# Patient Record
Sex: FEMALE | Race: WHITE | HISPANIC OR LATINO | Employment: UNEMPLOYED | URBAN - METROPOLITAN AREA
[De-identification: names, ages, dates, MRNs, and addresses within clinical notes are randomized per-mention and may not be internally consistent; named-entity substitution may affect disease eponyms.]

---

## 2024-10-10 ENCOUNTER — HOSPITAL ENCOUNTER (EMERGENCY)
Facility: HOSPITAL | Age: 39
Discharge: HOME | End: 2024-10-11
Attending: INTERNAL MEDICINE

## 2024-10-10 DIAGNOSIS — K59.00 CONSTIPATION, UNSPECIFIED CONSTIPATION TYPE: ICD-10-CM

## 2024-10-10 DIAGNOSIS — N28.1 CYST OF LEFT KIDNEY: Primary | ICD-10-CM

## 2024-10-10 LAB
ALBUMIN SERPL-MCNC: 3.7 G/DL (ref 3.5–5)
ALBUMIN/GLOB SERPL: 1.1 RATIO (ref 1.1–2)
ALP SERPL-CCNC: 145 UNIT/L (ref 40–150)
ALT SERPL-CCNC: 72 UNIT/L (ref 0–55)
ANION GAP SERPL CALC-SCNC: 9 MEQ/L
AST SERPL-CCNC: 33 UNIT/L (ref 5–34)
B-HCG UR QL: NEGATIVE
BASOPHILS # BLD AUTO: 0.03 X10(3)/MCL
BASOPHILS NFR BLD AUTO: 0.2 %
BILIRUB SERPL-MCNC: 0.4 MG/DL
BUN SERPL-MCNC: 16.7 MG/DL (ref 7–18.7)
CALCIUM SERPL-MCNC: 9.6 MG/DL (ref 8.4–10.2)
CHLORIDE SERPL-SCNC: 107 MMOL/L (ref 98–107)
CO2 SERPL-SCNC: 22 MMOL/L (ref 22–29)
CREAT SERPL-MCNC: 0.8 MG/DL (ref 0.55–1.02)
CREAT/UREA NIT SERPL: 21
CTP QC/QA: YES
EOSINOPHIL # BLD AUTO: 0.14 X10(3)/MCL (ref 0–0.9)
EOSINOPHIL NFR BLD AUTO: 1.2 %
ERYTHROCYTE [DISTWIDTH] IN BLOOD BY AUTOMATED COUNT: 13.3 % (ref 11.5–17)
GFR SERPLBLD CREATININE-BSD FMLA CKD-EPI: >60 ML/MIN/1.73/M2
GLOBULIN SER-MCNC: 3.4 GM/DL (ref 2.4–3.5)
GLUCOSE SERPL-MCNC: 110 MG/DL (ref 74–100)
HCT VFR BLD AUTO: 37.7 % (ref 37–47)
HGB BLD-MCNC: 12.8 G/DL (ref 12–16)
HOLD SPECIMEN: NORMAL
IMM GRANULOCYTES # BLD AUTO: 0.03 X10(3)/MCL (ref 0–0.04)
IMM GRANULOCYTES NFR BLD AUTO: 0.2 %
LIPASE SERPL-CCNC: 21 U/L
LYMPHOCYTES # BLD AUTO: 3.62 X10(3)/MCL (ref 0.6–4.6)
LYMPHOCYTES NFR BLD AUTO: 30.1 %
MCH RBC QN AUTO: 28.8 PG (ref 27–31)
MCHC RBC AUTO-ENTMCNC: 34 G/DL (ref 33–36)
MCV RBC AUTO: 84.7 FL (ref 80–94)
MONOCYTES # BLD AUTO: 0.66 X10(3)/MCL (ref 0.1–1.3)
MONOCYTES NFR BLD AUTO: 5.5 %
NEUTROPHILS # BLD AUTO: 7.55 X10(3)/MCL (ref 2.1–9.2)
NEUTROPHILS NFR BLD AUTO: 62.8 %
NRBC BLD AUTO-RTO: 0 %
PLATELET # BLD AUTO: 338 X10(3)/MCL (ref 130–400)
PMV BLD AUTO: 9.1 FL (ref 7.4–10.4)
POTASSIUM SERPL-SCNC: 3.9 MMOL/L (ref 3.5–5.1)
PROT SERPL-MCNC: 7.1 GM/DL (ref 6.4–8.3)
RBC # BLD AUTO: 4.45 X10(6)/MCL (ref 4.2–5.4)
SODIUM SERPL-SCNC: 138 MMOL/L (ref 136–145)
WBC # BLD AUTO: 12.03 X10(3)/MCL (ref 4.5–11.5)

## 2024-10-10 PROCEDURE — 25500020 PHARM REV CODE 255: Performed by: PHYSICIAN ASSISTANT

## 2024-10-10 PROCEDURE — 85025 COMPLETE CBC W/AUTO DIFF WBC: CPT | Performed by: PHYSICIAN ASSISTANT

## 2024-10-10 PROCEDURE — 63600175 PHARM REV CODE 636 W HCPCS: Performed by: PHYSICIAN ASSISTANT

## 2024-10-10 PROCEDURE — 96375 TX/PRO/DX INJ NEW DRUG ADDON: CPT

## 2024-10-10 PROCEDURE — 99285 EMERGENCY DEPT VISIT HI MDM: CPT | Mod: 25

## 2024-10-10 PROCEDURE — 96374 THER/PROPH/DIAG INJ IV PUSH: CPT

## 2024-10-10 PROCEDURE — 81001 URINALYSIS AUTO W/SCOPE: CPT | Performed by: PHYSICIAN ASSISTANT

## 2024-10-10 PROCEDURE — 83690 ASSAY OF LIPASE: CPT | Performed by: PHYSICIAN ASSISTANT

## 2024-10-10 PROCEDURE — 80053 COMPREHEN METABOLIC PANEL: CPT | Performed by: PHYSICIAN ASSISTANT

## 2024-10-10 PROCEDURE — 81025 URINE PREGNANCY TEST: CPT | Performed by: PHYSICIAN ASSISTANT

## 2024-10-10 RX ORDER — MORPHINE SULFATE 2 MG/ML
INJECTION, SOLUTION INTRAMUSCULAR; INTRAVENOUS
Status: DISCONTINUED
Start: 2024-10-10 | End: 2024-10-11 | Stop reason: WASHOUT

## 2024-10-10 RX ORDER — MORPHINE SULFATE 2 MG/ML
4 INJECTION, SOLUTION INTRAMUSCULAR; INTRAVENOUS ONCE
Status: COMPLETED | OUTPATIENT
Start: 2024-10-10 | End: 2024-10-10

## 2024-10-10 RX ORDER — DOCUSATE SODIUM 100 MG/1
100 CAPSULE, LIQUID FILLED ORAL 2 TIMES DAILY PRN
Qty: 20 CAPSULE | Refills: 0 | Status: SHIPPED | OUTPATIENT
Start: 2024-10-10 | End: 2024-10-20

## 2024-10-10 RX ORDER — KETOROLAC TROMETHAMINE 30 MG/ML
30 INJECTION, SOLUTION INTRAMUSCULAR; INTRAVENOUS
Status: COMPLETED | OUTPATIENT
Start: 2024-10-10 | End: 2024-10-10

## 2024-10-10 RX ORDER — ONDANSETRON 4 MG/1
4 TABLET, ORALLY DISINTEGRATING ORAL
Status: COMPLETED | OUTPATIENT
Start: 2024-10-11 | End: 2024-10-11

## 2024-10-10 RX ORDER — ONDANSETRON 4 MG/1
TABLET, ORALLY DISINTEGRATING ORAL
Status: DISPENSED
Start: 2024-10-10 | End: 2024-10-11

## 2024-10-10 RX ORDER — HYDROCODONE BITARTRATE AND ACETAMINOPHEN 5; 325 MG/1; MG/1
1 TABLET ORAL
Status: COMPLETED | OUTPATIENT
Start: 2024-10-11 | End: 2024-10-11

## 2024-10-10 RX ORDER — MORPHINE SULFATE 2 MG/ML
4 INJECTION, SOLUTION INTRAMUSCULAR; INTRAVENOUS ONCE
Status: DISCONTINUED | OUTPATIENT
Start: 2024-10-10 | End: 2024-10-10

## 2024-10-10 RX ORDER — MORPHINE SULFATE 2 MG/ML
INJECTION, SOLUTION INTRAMUSCULAR; INTRAVENOUS
Status: DISPENSED
Start: 2024-10-10 | End: 2024-10-11

## 2024-10-10 RX ORDER — KETOROLAC TROMETHAMINE 30 MG/ML
INJECTION, SOLUTION INTRAMUSCULAR; INTRAVENOUS
Status: DISPENSED
Start: 2024-10-10 | End: 2024-10-11

## 2024-10-10 RX ORDER — HYDROCODONE BITARTRATE AND ACETAMINOPHEN 5; 325 MG/1; MG/1
TABLET ORAL
Status: DISPENSED
Start: 2024-10-10 | End: 2024-10-11

## 2024-10-10 RX ADMIN — IOHEXOL 100 ML: 350 INJECTION, SOLUTION INTRAVENOUS at 08:10

## 2024-10-10 RX ADMIN — MORPHINE SULFATE 4 MG: 2 INJECTION, SOLUTION INTRAMUSCULAR; INTRAVENOUS at 11:10

## 2024-10-10 RX ADMIN — KETOROLAC TROMETHAMINE 30 MG: 30 INJECTION, SOLUTION INTRAMUSCULAR; INTRAVENOUS at 08:10

## 2024-10-11 VITALS
HEIGHT: 64 IN | WEIGHT: 193.81 LBS | SYSTOLIC BLOOD PRESSURE: 140 MMHG | OXYGEN SATURATION: 97 % | BODY MASS INDEX: 33.09 KG/M2 | DIASTOLIC BLOOD PRESSURE: 101 MMHG | RESPIRATION RATE: 16 BRPM | HEART RATE: 86 BPM | TEMPERATURE: 98 F

## 2024-10-11 LAB
BACTERIA #/AREA URNS AUTO: ABNORMAL /HPF
BILIRUB UR QL STRIP.AUTO: NEGATIVE
CLARITY UR: CLEAR
COLOR UR AUTO: COLORLESS
GLUCOSE UR QL STRIP: NORMAL
HGB UR QL STRIP: NEGATIVE
HYALINE CASTS #/AREA URNS LPF: ABNORMAL /LPF
KETONES UR QL STRIP: NEGATIVE
LEUKOCYTE ESTERASE UR QL STRIP: NEGATIVE
NITRITE UR QL STRIP: NEGATIVE
PH UR STRIP: 5.5 [PH]
PROT UR QL STRIP: NEGATIVE
RBC #/AREA URNS AUTO: ABNORMAL /HPF
SP GR UR STRIP.AUTO: 1.01 (ref 1–1.03)
SQUAMOUS #/AREA URNS LPF: ABNORMAL /HPF
UROBILINOGEN UR STRIP-ACNC: NORMAL
WBC #/AREA URNS AUTO: ABNORMAL /HPF

## 2024-10-11 PROCEDURE — 25000003 PHARM REV CODE 250: Performed by: PHYSICIAN ASSISTANT

## 2024-10-11 RX ADMIN — ONDANSETRON 4 MG: 4 TABLET, ORALLY DISINTEGRATING ORAL at 12:10

## 2024-10-11 RX ADMIN — HYDROCODONE BITARTRATE AND ACETAMINOPHEN 1 TABLET: 5; 325 TABLET ORAL at 12:10

## 2024-10-11 NOTE — DISCHARGE INSTRUCTIONS
Increase your daily water intake drinking 6-8 glasses of water every day.  Increase daily fiber intake, eating food high in fiber and/or taking fiber supplements that you can purchase over-the-counter at the pharmacy.  I also recommend taking daily probiotics to help encourage regular bowel movements.  You can also buy this at any pharmacy.  Return if symptoms worsen or do not improve.  Follow up with your primary care provider.  Referral sent to Internal Medicine for you to establish one.

## 2024-10-11 NOTE — ED PROVIDER NOTES
Encounter Date: 10/10/2024       History     Chief Complaint   Patient presents with    Abdominal Pain     LLQ pain x1 week     Karla Beltran is a 39 y.o. female who presents to the ED complaining of LLQ abdominal pain. Pt reports pain is sharp, stabbing in nature and occasionally radiates to lower back and right lower quadrant. Pain started 1 week ago and was intermittent, but has become constant and more severe today. Reports being diagnosed with diverticulosis in May and pain felt very similar. Last BM was today and was normal. She denies fevers, chills, nausea, vomiting, melena, hematochezia.     The history is provided by the patient and the spouse.     Review of patient's allergies indicates:  No Known Allergies  No past medical history on file.  No past surgical history on file.  No family history on file.     Review of Systems   Constitutional:  Negative for fever.   HENT:  Negative for sore throat.    Respiratory:  Negative for shortness of breath.    Cardiovascular:  Negative for chest pain.   Gastrointestinal:  Positive for abdominal pain. Negative for nausea and vomiting.   Genitourinary:  Negative for dysuria.   Musculoskeletal:  Negative for back pain.   Skin:  Negative for rash.   Neurological:  Negative for weakness.   Hematological:  Does not bruise/bleed easily.       Physical Exam     Initial Vitals [10/10/24 1910]   BP Pulse Resp Temp SpO2   (!) 139/98 100 20 97.9 °F (36.6 °C) 99 %      MAP       --         Physical Exam    Nursing note and vitals reviewed.  Constitutional: She appears well-developed and well-nourished. No distress.   HENT:   Head: Normocephalic and atraumatic. Mouth/Throat: No oropharyngeal exudate.   Eyes: EOM are normal. No scleral icterus.   Neck: Neck supple.   Normal range of motion.  Cardiovascular:  Normal rate and regular rhythm.           No murmur heard.  Pulmonary/Chest: Breath sounds normal. No respiratory distress. She has no wheezes.   Abdominal: Abdomen is soft.  Bowel sounds are normal. She exhibits no distension. There is abdominal tenderness in the left lower quadrant.   No right CVA tenderness.  No left CVA tenderness. There is no rebound and no guarding.   Musculoskeletal:         General: No tenderness. Normal range of motion.      Cervical back: Normal range of motion and neck supple.     Neurological: She is alert and oriented to person, place, and time. No cranial nerve deficit.   Skin: Skin is warm and dry. Capillary refill takes less than 2 seconds. No erythema.   Psychiatric: She has a normal mood and affect. Her behavior is normal. Judgment and thought content normal.         ED Course   Procedures  Labs Reviewed   COMPREHENSIVE METABOLIC PANEL - Abnormal       Result Value    Sodium 138      Potassium 3.9      Chloride 107      CO2 22      Glucose 110 (*)     Blood Urea Nitrogen 16.7      Creatinine 0.80      Calcium 9.6      Protein Total 7.1      Albumin 3.7      Globulin 3.4      Albumin/Globulin Ratio 1.1      Bilirubin Total 0.4            ALT 72 (*)     AST 33      eGFR >60      Anion Gap 9.0      BUN/Creatinine Ratio 21     CBC WITH DIFFERENTIAL - Abnormal    WBC 12.03 (*)     RBC 4.45      Hgb 12.8      Hct 37.7      MCV 84.7      MCH 28.8      MCHC 34.0      RDW 13.3      Platelet 338      MPV 9.1      Neut % 62.8      Lymph % 30.1      Mono % 5.5      Eos % 1.2      Basophil % 0.2      Lymph # 3.62      Neut # 7.55      Mono # 0.66      Eos # 0.14      Baso # 0.03      IG# 0.03      IG% 0.2      NRBC% 0.0     URINALYSIS, REFLEX TO URINE CULTURE - Abnormal    Color, UA Colorless (*)     Appearance, UA Clear      Specific Gravity, UA 1.011      pH, UA 5.5      Protein, UA Negative      Glucose, UA Normal      Ketones, UA Negative      Blood, UA Negative      Bilirubin, UA Negative      Urobilinogen, UA Normal      Nitrites, UA Negative      Leukocyte Esterase, UA Negative      RBC, UA 0-5      WBC, UA 0-5      Bacteria, UA None Seen      Squamous  Epithelial Cells, UA Trace (*)     Hyaline Casts, UA None Seen     LIPASE - Normal    Lipase Level 21     CBC W/ AUTO DIFFERENTIAL    Narrative:     The following orders were created for panel order CBC auto differential.  Procedure                               Abnormality         Status                     ---------                               -----------         ------                     CBC with Differential[[id number]]       Abnormal            Final result                 Please view results for these tests on the individual orders.   EXTRA TUBES    Narrative:     The following orders were created for panel order EXTRA TUBES.  Procedure                               Abnormality         Status                     ---------                               -----------         ------                     Light Blue Top Hold[[id number]]                             Final result               Light Green Top Hold[[id number]]                            Final result               Lavender Top Hold[[id number]]                               Final result               Gold Top Hold[[id number]]                                   Final result                 Please view results for these tests on the individual orders.   LIGHT BLUE TOP HOLD    Extra Tube Hold for add-ons.     LIGHT GREEN TOP HOLD    Extra Tube Hold for add-ons.     LAVENDER TOP HOLD    Extra Tube Hold for add-ons.     GOLD TOP HOLD    Extra Tube Hold for add-ons.     POCT URINE PREGNANCY    POC Preg Test, Ur Negative       Acceptable Yes            Imaging Results              CT Abdomen Pelvis With IV Contrast NO Oral Contrast (Preliminary result)  Result time 10/10/24 22:59:35      Preliminary result by Yoav Baldwin Jr., MD (10/10/24 22:59:35)                   Narrative:    START OF REPORT:  Technique: CT of the abdomen and pelvis was performed with axial images as well as sagittal and coronal reconstruction images with intravenous  contrast.    Comparison: None available.    Clinical History: Abdominal Pain(LLQ pain x1 week. Abdominal Pain(LLQ pain x1 week.    Dosage Information: Automated Exposure Control was utilized.    Findings:  Lines and Tubes: None.  Thorax:  Lungs: Minimal linear opacity is present at the visualized lung bases, consistent with scarring and atelectasis. No focal infiltrate or consolidation is seen.  Pleura: No effusions or thickening.  Heart: The heart size is within normal limits.  Abdomen:  Abdominal Wall: No abdominal wall pathology is seen.  Liver: The liver appears unremarkable.  Biliary System: No intrahepatic or extrahepatic biliary duct dilatation is seen.  Gallbladder: The gallbladder is non-distended and appears otherwise unremarkable.  Pancreas: The pancreas appears unremarkable.  Spleen: The spleen appears unremarkable.  Adrenals: The adrenal glands appear unremarkable.  Kidneys: The right kidney appears unremarkable with no stones cysts masses or hydronephrosis. A single cyst measuring 12.1 mm is seen on Image 81, Series 2 in the lower pole of the left kidney.  Aorta: The abdominal aorta appears unremarkable.  IVC: Unremarkable.  Bowel:  Esophagus: The visualized esophagus appears unremarkable.  Stomach: The stomach appears unremarkable.  Duodenum: Unremarkable appearing duodenum.  Small Bowel: The small bowel appears unremarkable.  Colon: There is moderate stool in the cecum and ascending colon colon which could reflect an element of constipation.  Appendix: The appendix appears unremarkable seen on Image 112, Series 2 through Image 102, Series 2.  Peritoneum: No intraperitoneal free air or ascites is seen.    Pelvis:  Bladder: The bladder appears unremarkable.  Female:  Uterus: The uterus appears unremarkable.  Ovaries: No adnexal masses are seen.    Bony structures:  Dorsal Spine: There is subtle spondylosis of the visualized dorsal spine.  Bony Pelvis: The visualized bony structures of the pelvis appear  unremarkable.      Impression:  1. No acute intraabdominal or pelvic solid organ or bowel pathology identified. Details and other findings as discussed above.                                         Medications   ketorolac injection 30 mg (30 mg Intravenous Given 10/10/24 2007)   iohexoL (OMNIPAQUE 350) injection 100 mL (100 mLs Intravenous Given 10/10/24 2052)   morphine injection 4 mg (4 mg Intravenous Given 10/10/24 2322)   HYDROcodone-acetaminophen 5-325 mg per tablet 1 tablet (1 tablet Oral Given 10/11/24 0000)   ondansetron disintegrating tablet 4 mg (4 mg Oral Given 10/11/24 0002)     Medical Decision Making  Differential: diverticulitis, colitis, among others    ED management: HDS and afebrile. LLQ abdominal tenderness. No rebound or guarding.  Unremarkable CBC, CMP and urinalysis.  Toradol, morphine and Norco given in the ED for pain.  CT abdomen and pelvis:  No acute intra-abdominal or pelvic solid organ or bowel pathology identified.  Moderate stool in colon. 12 mm left renal cyst.  Constipation will be treated with Colace and I recommended probiotics.  I will avoid narcotics due to constipation.  Gave strict ED precautions.  She could possibly having pain due to renal cyst.  Referral sent to Internal Medicine for further evaluation and follow up.    Amount and/or Complexity of Data Reviewed  Labs: ordered.  Radiology: ordered. Decision-making details documented in ED Course.    Risk  OTC drugs.  Prescription drug management.               ED Course as of 10/11/24 0040   Thu Oct 10, 2024   2052 Pt transitioned to Amarilys Arita PA-C at this time pending CT [KD]   2305 CT Abdomen Pelvis With IV Contrast NO Oral Contrast  No acute intraabdominal or pelvic solid organ or bowel pathology identified. [ER]      ED Course User Index  [ER] Amarilys Arita PA  [KD] Elsi Justice PA-C                           Clinical Impression:  Final diagnoses:  [N28.1] Cyst of left kidney (Primary)  [K59.00]  Constipation, unspecified constipation type          ED Disposition Condition    Discharge Stable          ED Prescriptions       Medication Sig Dispense Start Date End Date Auth. Provider    docusate sodium (COLACE) 100 MG capsule Take 1 capsule (100 mg total) by mouth 2 (two) times daily as needed for Constipation. 20 capsule 10/10/2024 10/20/2024 Amarilys Arita PA          Follow-up Information       Follow up With Specialties Details Why Contact Info Additional Information    [facility] Emergency Medicine  As needed, If symptoms worsen [address] [place] [id number] [phone]     [facility] Internal [facility] Internal Medicine Call  Ask For Internal Medicine clinic.  Once connected to clinic, inform them you were referred to clinic to establish care with a pcp. [address] [place] [id number] [phone] Internal Medicine Clinic Entrance #1             Amarilys Arita PA  10/11/24 0040

## 2024-10-22 ENCOUNTER — OFFICE VISIT (OUTPATIENT)
Dept: INTERNAL MEDICINE | Facility: CLINIC | Age: 39
End: 2024-10-22

## 2024-10-22 VITALS
HEIGHT: 64 IN | HEART RATE: 81 BPM | DIASTOLIC BLOOD PRESSURE: 85 MMHG | RESPIRATION RATE: 20 BRPM | WEIGHT: 197 LBS | SYSTOLIC BLOOD PRESSURE: 138 MMHG | TEMPERATURE: 99 F | BODY MASS INDEX: 33.63 KG/M2

## 2024-10-22 DIAGNOSIS — R74.8 ELEVATED LIVER ENZYMES: ICD-10-CM

## 2024-10-22 DIAGNOSIS — Z00.00 WELL ADULT EXAM: ICD-10-CM

## 2024-10-22 DIAGNOSIS — Q21.12 PATENT FORAMEN OVALE: ICD-10-CM

## 2024-10-22 DIAGNOSIS — F17.200 VAPING NICOTINE DEPENDENCE, NON-TOBACCO PRODUCT: ICD-10-CM

## 2024-10-22 DIAGNOSIS — R23.2 HOT FLASHES: ICD-10-CM

## 2024-10-22 DIAGNOSIS — Z85.41 HISTORY OF CERVICAL CANCER: ICD-10-CM

## 2024-10-22 DIAGNOSIS — K21.9 GASTROESOPHAGEAL REFLUX DISEASE, UNSPECIFIED WHETHER ESOPHAGITIS PRESENT: ICD-10-CM

## 2024-10-22 DIAGNOSIS — F32.A DEPRESSION, UNSPECIFIED DEPRESSION TYPE: Primary | ICD-10-CM

## 2024-10-22 DIAGNOSIS — Z12.31 ENCOUNTER FOR SCREENING MAMMOGRAM FOR MALIGNANT NEOPLASM OF BREAST: ICD-10-CM

## 2024-10-22 DIAGNOSIS — N32.81 OAB (OVERACTIVE BLADDER): ICD-10-CM

## 2024-10-22 DIAGNOSIS — K59.00 CONSTIPATION, UNSPECIFIED CONSTIPATION TYPE: ICD-10-CM

## 2024-10-22 DIAGNOSIS — E66.9 OBESITY, UNSPECIFIED CLASS, UNSPECIFIED OBESITY TYPE, UNSPECIFIED WHETHER SERIOUS COMORBIDITY PRESENT: ICD-10-CM

## 2024-10-22 DIAGNOSIS — F10.10 ETOH ABUSE: ICD-10-CM

## 2024-10-22 DIAGNOSIS — Z86.73 HISTORY OF CVA (CEREBROVASCULAR ACCIDENT): ICD-10-CM

## 2024-10-22 DIAGNOSIS — N28.1 CYST OF LEFT KIDNEY: ICD-10-CM

## 2024-10-22 DIAGNOSIS — Z13.6 SCREENING FOR HYPERTENSION: ICD-10-CM

## 2024-10-22 DIAGNOSIS — Z80.3 FAMILY HISTORY OF BREAST CANCER: ICD-10-CM

## 2024-10-22 PROCEDURE — 99215 OFFICE O/P EST HI 40 MIN: CPT | Mod: PBBFAC | Performed by: NURSE PRACTITIONER

## 2024-10-22 PROCEDURE — 99205 OFFICE O/P NEW HI 60 MIN: CPT | Mod: S$PBB,,, | Performed by: NURSE PRACTITIONER

## 2024-10-22 RX ORDER — LOSARTAN POTASSIUM AND HYDROCHLOROTHIAZIDE 12.5; 5 MG/1; MG/1
1 TABLET ORAL
COMMUNITY
Start: 2024-07-01 | End: 2024-10-22

## 2024-10-22 RX ORDER — OXYBUTYNIN CHLORIDE 5 MG/1
5 TABLET, EXTENDED RELEASE ORAL DAILY
Qty: 30 TABLET | Refills: 6 | Status: SHIPPED | OUTPATIENT
Start: 2024-10-22 | End: 2025-10-22

## 2024-10-22 RX ORDER — METOPROLOL SUCCINATE 25 MG/1
25 TABLET, EXTENDED RELEASE ORAL
COMMUNITY
Start: 2024-07-01 | End: 2024-10-22

## 2024-10-22 NOTE — PROGRESS NOTES
Patient ID: [id number]     Chief Complaint: Establish Care        HPI:     JACKIE Beltran is a 39 y.o. female here today to establish care.             -------------------------------------    GERD (gastroesophageal reflux disease)    History of alcohol abuse    History of cervical cancer    2004    History of CVA (cerebrovascular accident)    May 2008.    History of depression    History of diverticulosis    Patent foramen ovale        History reviewed. No pertinent surgical history.    Review of patient's allergies indicates:  No Known Allergies    Current Outpatient Medications   Medication Instructions    oxybutynin (DITROPAN-XL) 5 mg, Oral, Daily       Social History     Socioeconomic History    Marital status:    Tobacco Use    Smoking status: Every Day     Types: Vaping with nicotine    Smokeless tobacco: Never   Substance and Sexual Activity    Alcohol use: Not Currently    Drug use: Never     Social Drivers of Health     Financial Resource Strain: Low Risk  (5/10/2023)    Received from [employer] [facility] and Its Subsidiaries and Affiliates    Overall Financial Resource Strain (CARDIA)     Difficulty of Paying Living Expenses: Not very hard   Food Insecurity: Food Insecurity Present (5/10/2023)    Received from [employer] [facility] Its Subsidiaries and Affiliates    Hunger Vital Sign     Worried About Running Out of Food in the Last Year: Sometimes true     Ran Out of Food in the Last Year: Never true   Transportation Needs: No Transportation Needs (5/10/2023)    Received from [facility] [facility] Its Subsidiaries and Affiliates    PRAPARE - Transportation     Lack of Transportation (Medical): No     Lack of Transportation (Non-Medical): No   Physical Activity: Inactive (5/10/2023)    Received from [facility] [facility] Its Subsidiaries and Affiliates    Exercise  Vital Sign     Days of Exercise per Week: 0 days     Minutes of Exercise per Session: 0 min   Stress: Stress Concern Present (5/10/2023)    Received from [facility] and Its Subsidiaries and Affiliates    [employer] [place] [employer] - Occupational Stress Questionnaire     Feeling of Stress : Very much   Housing Stability: Low Risk  (5/10/2023)    Received from [facility] and Its Subsidiaries and Affiliates    Housing Stability Vital Sign     Unable to Pay for Housing in the Last Year: No     Number of Places Lived in the Last Year: 1     Unstable Housing in the Last Year: No        Family History   Problem Relation Name Age of Onset    Hernia Mother      Breast cancer Mother      No Known Problems Father      Breast cancer Maternal Grandmother      Breast cancer Maternal Great-Grandmother          Patient Care Team:  Yocasta Gonzales FNP as PCP - General (Family Medicine)     Subjective:     Review of Systems     See HPI for details    Constitutional: Denies Change in appetite. Denies Chills. Denies Fever. Denies Night sweats.  Eye: Denies Blurred vision. Denies Discharge. Denies Eye pain.  ENT: Denies Decreased hearing. Denies Sore throat. Denies Swollen glands.  Respiratory: Denies Cough. Denies Shortness of breath. Denies Shortness of breath with exertion. Denies Wheezing.  Cardiovascular: DeniesChest pain at rest. Denies Chest pain with exertion. Denies Irregular heartbeat. Denies Palpitations. Denies Edema.  Gastrointestinal: Denies Abdominal pain. DeniesDiarrhea. Denies Nausea. Denies Vomiting. Denies Hematemesis or Hematochezia.  Genitourinary: Denies Dysuria. Denies Urinary frequency. Denies Urinary urgency. Denies Blood in urine.  Endocrine: Denies Cold intolerance. Denies Excessive thirst. Denies Heat intolerance. Denies Weight loss. Denies Weight gain.  Musculoskeletal: Denies Painful joints. Denies  "Weakness.  Integumentary: Denies Rash. Denies Itching. Denies Dry skin.  Neurologic: Denies Dizziness. Denies Fainting. Denies Headache.  Psychiatric: Denies Depression. Denies Anxiety. Denies Suicidal/Homicidal ideations.    All Other ROS: Negative except as stated in HPI.       Objective:     Visit Vitals  /85 (BP Location: Right arm, Patient Position: Sitting)   Pulse 81   Temp 98.5 °F (36.9 °C) (Oral)   Resp 20   Ht 5' 4" (1.626 m)   Wt 89.4 kg (197 lb)   LMP 09/24/2024 (Exact Date)   BMI 33.81 kg/m²       Physical Exam    General: Alert and oriented, No acute distress.  Head: Normocephalic, Atraumatic.  Eye: Pupils are equal, round and reactive to light, Extraocular movements are intact, Sclera non-icteric.  Ears/Nose/Throat: Normal, Mucosa moist,Clear.  Neck/Thyroid: Supple, Non-tender, No carotid bruit, No lymphadenopathy, No JVD, Full range of motion.  Respiratory: Clear to auscultation bilaterally; No wheezes, rales or rhonchi,Non-labored respirations, Symmetrical chest wall expansion.  Cardiovascular: Regular rate and rhythm, S1/S2 normal, No murmurs, rubs or gallops.  Gastrointestinal: Soft, Non-tender, Non-distended, Normal bowel sounds, No palpable organomegaly.  Musculoskeletal: Normal range of motion.  Integumentary: Warm, Dry, Intact, No suspicious lesions or rashes.  Extremities: No clubbing, cyanosis or edema  Neurologic: No focal deficits, Cranial Nerves II-XII are grossly intact, Motor strength normal upper and lower extremities, Sensory exam intact.  Psychiatric: Normal interaction, Coherent speech, Euthymic mood, Appropriate affect       Labs Reviewed:     Chemistry:  Lab Results   Component Value Date     10/10/2024    K 3.9 10/10/2024    BUN 16.7 10/10/2024    CREATININE 0.80 10/10/2024    EGFRNORACEVR >60 10/10/2024    GLUCOSE 110 (H) 10/10/2024    CALCIUM 9.6 10/10/2024    ALKPHOS 145 10/10/2024    LABPROT 7.1 10/10/2024    ALBUMIN 3.7 10/10/2024    AST 33 10/10/2024    ALT 72 " "(H) 10/10/2024        No results found for: "HGBA1C", "MICROALBCREA"     Hematology:  Lab Results   Component Value Date    WBC 12.03 (H) 10/10/2024    HGB 12.8 10/10/2024    HCT 37.7 10/10/2024     10/10/2024       Lipid Panel:  Lab Results   Component Value Date    CHOL 177 05/16/2023    HDL 52 05/16/2023    TRIG 116 05/16/2023        Urine:  Lab Results   Component Value Date    APPEARANCEUA Clear 10/10/2024    SGUA 1.011 10/10/2024    PROTEINUA Negative 10/10/2024    KETONESUA Negative 10/10/2024    LEUKOCYTESUR Negative 10/10/2024    RBCUA 0-5 10/10/2024    WBCUA 0-5 10/10/2024    BACTERIA None Seen 10/10/2024    SQEPUA Trace (A) 10/10/2024    HYALINECASTS None Seen 10/10/2024        Assessment:       ICD-10-CM ICD-9-CM   1. Depression, unspecified depression type  F32.A 311   2. Cyst of left kidney  N28.1 753.10   3. Constipation, unspecified constipation type  K59.00 564.00   4. History of CVA (cerebrovascular accident)  Z86.73 V12.54   5. Patent foramen ovale  Q21.12 745.5   6. Obesity, unspecified class, unspecified obesity type, unspecified whether serious comorbidity present  E66.9 278.00   7. ETOH abuse  F10.10 305.00   8. Gastroesophageal reflux disease, unspecified whether esophagitis present  K21.9 530.81   9. Elevated liver enzymes  R74.8 790.5   10. Screening for hypertension  Z13.6 V81.1   11. Well adult exam  Z00.00 V70.0   12. Vaping nicotine dependence, non-tobacco product  F17.200 305.1   13. Encounter for screening mammogram for malignant neoplasm of breast  Z12.31 V76.12   14. Family history of breast cancer  Z80.3 V16.3   15. History of cervical cancer  Z85.41 V10.41   16. OAB (overactive bladder)  N32.81 596.51   17. Hot flashes  R23.2 782.62        Plan:     1. Cyst of left kidney  Pt had CT Abd and pelvis with contrast done 10-10-24 showing:  CT Abdomen Pelvis With IV Contrast NO Oral Contrast  Order: [id number]  Status: Final result       Visible to patient: Yes (seen)       Next " appt: 11/15/2024 at 09:10 AM in Gynecology (Maame Johnathan, [facility])    0 Result Notes  Details    Reading Physician Reading Date Result Priority   Yoav Baldwin Jr., MD  [phone] 10/10/2024 STAT   Damir Sharp MD  [phone] 10/11/2024      Narrative & Impression     Technique: CT of the abdomen and pelvis was performed with axial images as well as sagittal and coronal reconstruction images with intravenous contrast.     Comparison: None available.     Clinical History: Abdominal Pain(LLQ pain x1 week. Abdominal Pain(LLQ pain x1 week.     Dosage Information: Automated Exposure Control was utilized.       Findings:     Lines and Tubes: None.     Thorax:     Lungs: Minimal linear opacity is present at the visualized lung bases, consistent with scarring and atelectasis. No focal infiltrate or consolidation is seen.     Pleura: No effusions or thickening.     Abdomen:     Abdominal Wall: No abdominal wall pathology is seen.     Liver: The liver appears unremarkable.     Biliary System: No intrahepatic or extrahepatic biliary duct dilatation is seen.     Gallbladder: The gallbladder is non-distended and appears otherwise unremarkable.     Pancreas: The pancreas appears unremarkable.     Spleen: The spleen appears unremarkable.     Adrenals: The adrenal glands appear unremarkable.     Kidneys: The right kidney appears unremarkable with no stones cysts masses or hydronephrosis. A single cyst measuring 12.1 mm is seen on Image 81, Series 2 in the lower pole of the left kidney for which no specific follow-up imaging is recommended.     Bowel:     Esophagus: The visualized esophagus appears unremarkable.     Stomach: The stomach appears unremarkable.     Duodenum: Unremarkable appearing duodenum.     Small Bowel: The small bowel appears unremarkable.     Colon: There is moderate stool in the cecum and ascending colon colon which could reflect an element of constipation.     Appendix: The appendix  appears unremarkable seen on Image 112, Series 2 through Image 102, Series 2.     Peritoneum: No intraperitoneal free air or ascites is seen.     Pelvis:     Bladder: The bladder appears unremarkable.     Female:     Uterus: The uterus appears unremarkable.     Ovaries: No adnexal masses are seen.     Bony structures:     Dorsal Spine: There is subtle spondylosis of the visualized dorsal spine.     Bony Pelvis: The visualized bony structures of the pelvis appear unremarkable.     Impression:  Impression:     No acute intraabdominal or pelvic solid organ or bowel pathology identified. Details and other findings as discussed above.     No significant discrepancy with overnight report.        Electronically signed by:Damir Sharp  Date:                                            10/11/2024  Time:                                           07:51        Exam Ended: 10/10/24 22:59 CDT Last Resulted: 10/11/24 07:51 CDT     As per radiologist recommendation- no further imaging needed.   - Ambulatory referral/consult to Internal Medicine    2. Constipation, unspecified constipation type  Encouraged increased fiber in diet. Drink plenty of water. Encouraged OTC medications as prescribed prn constipation.   - Ambulatory referral/consult to Internal Medicine    3. Depression, unspecified depression type (Primary)  Denies SI/HI. Will monitor.     4. History of CVA (cerebrovascular accident)  Instructed on importance of good BP control to prevent reoccurrence.     5. Patent foramen ovale  Refer to Cardio cl for further evaluation and management.     6. Obesity, unspecified class, unspecified obesity type, unspecified whether serious comorbidity present  BMI 33. Encouraged low fat diet and exercise. Education provided.     7. ETOH abuse  Encouraged ETOH cessation. Education provided.     8. Gastroesophageal reflux disease, unspecified whether esophagitis present  Avoid triggers.     9. Elevated liver enzymes  ALT 72. Encouraged low  protein low salt diet, Avoid Tylenol and ETOH. CMP in 3 months.     10. Screening for hypertension  Labs in 3 months.     11. Well adult exam  Labs in 3 months. Refer to Gyn cl for annual GYN exam. MMG  in 3 months.      12 Hot flashes  Labs in 3 months. Refer to Gyn cl for eval and management.     13. Vaping Nicotene dependence  Encouraged smoking cessation. Education provided. Refer to Smoking Cessation for assistance with smoking cessation.     Follow up in about 15 weeks (around 2/4/2025) for with labs 1 week prior to appt. . In addition to their scheduled follow up, the patient has also been instructed to follow up on as needed basis.     Future Appointments   Date Time Provider Department Center   11/15/2024  9:10 AM Maame Mann FNP [facility] [place]   11/19/2024  9:00 AM Maame Shahid CTTS [facility] [place]   11/26/2024  8:45 AM [facility] MAMMO1 [facility] MAMMO [place]   12/17/2024  9:00 AM [facility] ECHO 01 [facility] ECHO [place]   2/4/2025  8:20 AM Yocatsa Gonzales FNP [facility] [place]        ONUR Sales

## 2024-11-19 ENCOUNTER — OFFICE VISIT (OUTPATIENT)
Dept: GYNECOLOGY | Facility: CLINIC | Age: 39
End: 2024-11-19
Payer: MEDICAID

## 2024-11-19 VITALS
HEIGHT: 64 IN | TEMPERATURE: 98 F | SYSTOLIC BLOOD PRESSURE: 138 MMHG | OXYGEN SATURATION: 100 % | BODY MASS INDEX: 33.87 KG/M2 | HEART RATE: 84 BPM | RESPIRATION RATE: 18 BRPM | DIASTOLIC BLOOD PRESSURE: 78 MMHG | WEIGHT: 198.38 LBS

## 2024-11-19 DIAGNOSIS — Z72.0 TOBACCO ABUSE: ICD-10-CM

## 2024-11-19 DIAGNOSIS — N39.45 CONTINUOUS LEAKAGE OF URINE: ICD-10-CM

## 2024-11-19 DIAGNOSIS — Z12.4 ENCOUNTER FOR PAPANICOLAOU SMEAR FOR CERVICAL CANCER SCREENING: Primary | ICD-10-CM

## 2024-11-19 DIAGNOSIS — N93.9 ABNORMAL UTERINE BLEEDING: ICD-10-CM

## 2024-11-19 DIAGNOSIS — Z00.00 WELL ADULT EXAM: ICD-10-CM

## 2024-11-19 LAB
C TRACH DNA SPEC QL NAA+PROBE: NOT DETECTED
CLUE CELLS VAG QL WET PREP: ABNORMAL
N GONORRHOEA DNA SPEC QL NAA+PROBE: NOT DETECTED
SOURCE (OHS): NORMAL
T VAGINALIS VAG QL WET PREP: ABNORMAL
WBC #/AREA VAG WET PREP: ABNORMAL
YEAST SPEC QL WET PREP: ABNORMAL

## 2024-11-19 PROCEDURE — 87210 SMEAR WET MOUNT SALINE/INK: CPT

## 2024-11-19 PROCEDURE — 99214 OFFICE O/P EST MOD 30 MIN: CPT | Mod: PBBFAC

## 2024-11-19 PROCEDURE — 87491 CHLMYD TRACH DNA AMP PROBE: CPT

## 2024-11-19 NOTE — PROGRESS NOTES
[facility] -  Gynecology / Women's Health Clinic     Subjective:      Patient ID: Karla Beltran is a 39 y.o. female.    Chief Complaint:  Gynecologic Exam      History of Present Illness:  The patient  here for annual exam. Admits hx of abnormal pap  precancerous cells with cryo therapy. Last pap - ASCUS and HPV neg. Denies breast complaints. Denies pelvic pain or abnormal vaginal discharge. Pt reports hx of chlamydia/trichomonas and no concerns today. Denies any current contraception, requesting Paraguard. Admits tobacco use. Dep. screening 0. Denies fly hx of ovarian, uterine or colon cancer. Mother, MGM, MGGM with breast cancer.     The patient presents to the clinic with c/o AUB. Her LMP was 10/23/24. Period last 3-10 days and changes pads every 2-3 hours on heaviest 5-6 days per patient. Admits cycles are monthly but not every month is a long and heavy cycle. Admits 3-4 months this year had a cycle where changed pads every 2-3 hours lasting 7-10 days. Admits change in cycle within the year. Pt states cycles are manageable at this time and requesting for Paraguard IUD. Pt declines hormonal options. Hx of Stroke  while on combination contraception patch and again . Denies physical deficits from stroke, admits aphasia occ. Admits urinary leakage, wears pad daily for leakage, worsening within the year.     GYN & OB History:  Patient's last menstrual period was 10/23/2024 (approximate).     OB History    Para Term  AB Living   3 3 3         SAB IAB Ectopic Multiple Live Births                  # Outcome Date GA Lbr Abdullahi/2nd Weight Sex Type Anes PTL Lv   3 Term      Vag-Spont      2 Term      Vag-Spont      1 Term      Vag-Spont          Past Medical History:   Diagnosis Date    Abnormal Pap smear of cervix     GERD (gastroesophageal reflux disease)     History of alcohol abuse     History of cervical cancer         History of CVA (cerebrovascular accident)  "    May 2008.    History of depression     History of diverticulosis     Patent foramen ovale         History reviewed. No pertinent surgical history.     Social History     Tobacco Use    Smoking status: Every Day     Types: Vaping with nicotine    Smokeless tobacco: Never   Substance and Sexual Activity    Alcohol use: Not Currently    Drug use: Never    Sexual activity: Yes        Current Outpatient Medications   Medication Instructions    metroNIDAZOLE (FLAGYL) 500 mg, Oral, 2 times daily, Do not drink alcohol during treatment and for 3 days after completion.    oxybutynin (DITROPAN-XL) 5 mg, Oral, Daily       Review of patient's allergies indicates:  No Known Allergies      Review of Systems:  Review of Systems  Negative except for pertinent findings for positives per HPI.     Objective:     Physical Exam   Visit Vitals  /78 (Patient Position: Sitting)   Pulse 84   Temp 98.4 °F (36.9 °C) (Oral)   Resp 18   Ht 5' 4" (1.626 m)   Wt 90 kg (198 lb 6.4 oz)   LMP 10/23/2024 (Approximate)   SpO2 100%   BMI 34.06 kg/m²       GENERAL: Well-developed female. No acute distress.    SKIN: Normal to inspection, warm and intact.  BREASTS: No rashes or erythema. No masses, lumps, discharge, tenderness.  VULVA: General appearance normal; external genitalia with no lesions or erythema.  VAGINA: Mucosa/vaginal vault pink, no abnormal discharge or lesions.  CERVIX: Pink, parous appearing os, no erythema or abnormal discharge.  BIMANUAL EXAM: reveals a 8 week-sized uterus. The uterus isnon tender. Bilateral adnexa reveal no tenderness.  PSYCHIATRIC: Patient is oriented to person, place, and time. Mood and affect are normal.    Assessment:       ICD-10-CM ICD-9-CM   1. Encounter for Papanicolaou smear for cervical cancer screening  Z12.4 V76.2   2. Well adult exam  Z00.00 V70.0   3. Abnormal uterine bleeding  N93.9 626.9   4. Continuous leakage of urine  N39.45 788.37   5. Tobacco abuse  Z72.0 305.1       Plan:     1. " Encounter for Papanicolaou smear for cervical cancer screening  -     Liquid-Based Pap Smear, Screening    2. Well adult exam  -     Ambulatory referral/consult to Gynecology    3. Abnormal uterine bleeding  -     Chlamydia/GC, PCR  -     Wet Prep, Genital  -     US Pelvis Comp with Transvag NON-OB (xpd; Future; Expected date: 11/19/2024    4. Continuous leakage of urine  -     Ambulatory referral/consult to Urology; Future; Expected date: 11/26/2024    5. Tobacco abuse    Pap today  Wet prep and G/C  Pelvic uls ordered  H/H 12.8/37.7 on 10/2024    Patient requesting Paraguard insertion; Refer to GYN residents/docs for contraception, personal hx of Stroke x2 2008/2015 hx of contraception patch with first stroke    Discussed risk factors of incontinence-age, obesity and smoking. Encouraged lifestyle modifications such as weight loss, dec alcoholic, carbonated and caffeinated drinks and smoking cessation. Educated on and encouraged pt to perform kegel exercices starting at 10 contractions and 10 relaxations 4 x/day. Urology consult sent.     Instructed patient on timed voiding every 2-3 hrs, double voiding, avoidance of bladder irritants such as alcohol, citrus foods, chocolate, caffeinated drinks, energy drinks, spicy foods, sugar, caffeine products, sodas. Instructed patient to avoid drinking fluids 1-2 hours prior to bedtime & void immediately before bedtime.     Patient counseled on decreasing soft drinks, tea, and other beverages that can be irritating to the bladder.  Discussed with patient recommendation to contract her pelvic floor muscles when she feels the urge to urinate before trying to run to restroom. Hold muscles for 7-8 seconds prior to walking to bathroom. This should help reverse signal to urinate for enough time to make it to restroom.     Smoking cessation counseling provided. Instructed on smoking cessation program through [facility] and pharmacological interventions to aid in cessation.    Call with any  GYN concerns  Follow up in about 1 year (around 11/19/2025) for Annual.

## 2024-11-20 ENCOUNTER — TELEPHONE (OUTPATIENT)
Dept: GYNECOLOGY | Facility: CLINIC | Age: 39
End: 2024-11-20
Payer: MEDICAID

## 2024-11-20 DIAGNOSIS — N76.0 BACTERIAL VAGINOSIS: Primary | ICD-10-CM

## 2024-11-20 DIAGNOSIS — B96.89 BACTERIAL VAGINOSIS: Primary | ICD-10-CM

## 2024-11-20 RX ORDER — METRONIDAZOLE 500 MG/1
500 TABLET ORAL 2 TIMES DAILY
Qty: 14 TABLET | Refills: 0 | Status: SHIPPED | OUTPATIENT
Start: 2024-11-20 | End: 2024-11-27

## 2024-11-20 NOTE — TELEPHONE ENCOUNTER
Nurse:Called the patient to informed her of swabs results and medication per Maame Mann NP request and patient is  not available at this time will call before leaving today. NATHAN    ----- Message from ONUR Bailey sent at 11/20/2024  9:00 AM CST -----  Swab showed clue cells indicating bacterial vaginosis. Not an STD but an infection in the vaginal area when pH is disrupted. Rx sent for Flagyl. No alcohol during and for 48-72 hours after treatment. Use unscented soap and no scented products. More showers than baths. No douching.

## 2024-11-20 NOTE — TELEPHONE ENCOUNTER
Nurse: MARIAMA verified, The patient informed of her swabs results that was performed in our clinic by the provider in this clinic. The patient was also informed of her Flagyl medication was sent to her pharmacy in her chart that she preferred to use. Patient was also given recommendations from the provider what to start doing and she voiced understanding of the information that was giving to her by saying so take more showers and bathtub and use unsecented soap ok didn't know that and thanks for calling me with my results. Patient was asked if she had any further questions and per no ma'am I am good. The patient was instructed to call us with any gyn concerns she may have and she said I sure will if I need anything gyn answered.       ----- Message from ONUR Bailey sent at 2024  9:00 AM CST -----  Swab showed clue cells indicating bacterial vaginosis. Not an STD but an infection in the vaginal area when pH is disrupted. Rx sent for Flagyl. No alcohol during and for 48-72 hours after treatment. Use unscented soap and no scented products. More showers than baths. No douching.

## 2024-11-25 ENCOUNTER — TELEPHONE (OUTPATIENT)
Dept: GYNECOLOGY | Facility: CLINIC | Age: 39
End: 2024-11-25
Payer: MEDICAID

## 2024-11-25 NOTE — TELEPHONE ENCOUNTER
verified. Pap smear results explained to patient. Immune health tips discussed. Per ASCCP guidelines repeat pap smear in 1 yr at annual exam. Stated understanding, no further questions.

## 2024-11-26 ENCOUNTER — HOSPITAL ENCOUNTER (OUTPATIENT)
Dept: RADIOLOGY | Facility: HOSPITAL | Age: 39
Discharge: HOME | End: 2024-11-26
Attending: NURSE PRACTITIONER
Payer: MEDICAID

## 2024-11-26 DIAGNOSIS — Z80.3 FAMILY HISTORY OF BREAST CANCER: ICD-10-CM

## 2024-11-26 DIAGNOSIS — Z12.31 ENCOUNTER FOR SCREENING MAMMOGRAM FOR MALIGNANT NEOPLASM OF BREAST: ICD-10-CM

## 2024-11-26 PROCEDURE — 77063 BREAST TOMOSYNTHESIS BI: CPT | Mod: TC

## 2024-12-02 ENCOUNTER — TELEPHONE (OUTPATIENT)
Dept: SMOKING CESSATION | Facility: CLINIC | Age: 39
End: 2024-12-02
Payer: MEDICAID

## 2024-12-02 NOTE — TELEPHONE ENCOUNTER
Contacted pt regarding rescheduling her canceled [facility] appointment.  Pt rescheduled to January 7, 2025 at 9 am.

## 2024-12-13 ENCOUNTER — TELEPHONE (OUTPATIENT)
Dept: GYNECOLOGY | Facility: CLINIC | Age: 39
End: 2024-12-13
Payer: MEDICAID

## 2024-12-13 NOTE — TELEPHONE ENCOUNTER
Good morning,     Checking on referrals and noticed this patient has not been scheduled with Urology yet. Can patient please be reached to schedule?     Thank you!

## 2024-12-20 ENCOUNTER — HOSPITAL ENCOUNTER (OUTPATIENT)
Dept: RADIOLOGY | Facility: HOSPITAL | Age: 39
Discharge: HOME | End: 2024-12-20
Attending: NURSE PRACTITIONER
Payer: MEDICAID

## 2024-12-20 DIAGNOSIS — R92.8 ABNORMAL MAMMOGRAM: ICD-10-CM

## 2024-12-20 PROCEDURE — 76642 ULTRASOUND BREAST LIMITED: CPT | Mod: TC,LT

## 2024-12-20 PROCEDURE — 77062 BREAST TOMOSYNTHESIS BI: CPT | Mod: TC

## 2024-12-30 DIAGNOSIS — F41.9 ANXIETY: Primary | ICD-10-CM

## 2024-12-30 RX ORDER — LORAZEPAM 2 MG/1
2 TABLET ORAL ONCE
Qty: 1 TABLET | Refills: 0 | Status: SHIPPED | OUTPATIENT
Start: 2024-12-30 | End: 2024-12-30

## 2024-12-30 NOTE — PROGRESS NOTES
Pt called clinic requesting medication to help with anxiety due to having Breast Biopsy scheduled so that she make take day of procedure. RX Ativan 2 mg Take 1 tab po 1 hour prior to procedure. Pt informed to have someone drive her to and from breast biopsy appt due to sedative effects.

## 2025-01-09 ENCOUNTER — TELEPHONE (OUTPATIENT)
Dept: SMOKING CESSATION | Facility: CLINIC | Age: 40
End: 2025-01-09
Payer: MEDICAID

## 2025-01-09 NOTE — TELEPHONE ENCOUNTER
Contacted pt regarding rescheduling her canceled [facility] appointment.  Pt identified herself. I then identified myself and stated why I was calling and pt hung up.

## 2025-01-17 ENCOUNTER — TELEPHONE (OUTPATIENT)
Dept: RADIOLOGY | Facility: HOSPITAL | Age: 40
End: 2025-01-17
Payer: MEDICAID

## 2025-01-17 ENCOUNTER — PROCEDURE VISIT (OUTPATIENT)
Dept: GYNECOLOGY | Facility: CLINIC | Age: 40
End: 2025-01-17
Payer: MEDICAID

## 2025-01-17 VITALS
OXYGEN SATURATION: 98 % | TEMPERATURE: 99 F | SYSTOLIC BLOOD PRESSURE: 117 MMHG | HEIGHT: 64 IN | WEIGHT: 187 LBS | RESPIRATION RATE: 18 BRPM | BODY MASS INDEX: 31.92 KG/M2 | DIASTOLIC BLOOD PRESSURE: 81 MMHG

## 2025-01-17 DIAGNOSIS — Z30.430 ENCOUNTER FOR IUD INSERTION: Primary | ICD-10-CM

## 2025-01-17 PROCEDURE — 58300 INSERT INTRAUTERINE DEVICE: CPT | Mod: PBBFAC

## 2025-01-17 PROCEDURE — 58300 INSERT INTRAUTERINE DEVICE: CPT | Mod: 25,PBBFAC

## 2025-01-17 RX ADMIN — COPPER 1 INTRA UTERINE DEVICE: 313.4 INTRAUTERINE DEVICE INTRAUTERINE at 10:01

## 2025-01-17 NOTE — PROGRESS NOTES
I notified that her biopsy scheduled on Wed 1/22/25 is cancelled secondary to weather and that we would call as soon as we are able to get back in to reschedule the appt.  I informed her that if we are able to safely travel on Wednesday, we would call early Wednesday morning to see if she could safely travel to come in for the biopsy, as originally scheduled.  She verbalized understanding and said she would wait to hear from us.

## 2025-01-17 NOTE — PROGRESS NOTES
[facility] OBSTETRICS AND GYNECOLOGY CLINIC NOTE     Karla Beltran is a 40 y.o.  presenting to GYN clinic for ParaGard insertion. LMP 2024 , Periods last 3-10 days and occur every 28-30 days.    Of note, Hx of abnormal pap smear  with precancerous cells treated with cryotherapy. Last pap smear 2024 NIL, positive for HPV. Reports history of cervical cancer in . History of stroke in  while on combined contraception patch and again in . No focal deficits.    Past Medical History:   Diagnosis Date    Abnormal Pap smear of cervix     GERD (gastroesophageal reflux disease)     History of alcohol abuse     History of cervical cancer         History of CVA (cerebrovascular accident)     May 2008.    History of depression     History of diverticulosis     Patent foramen ovale       History reviewed. No pertinent surgical history.   OB History    Para Term  AB Living   3 3 3         SAB IAB Ectopic Multiple Live Births                  # Outcome Date GA Lbr Abdullahi/2nd Weight Sex Type Anes PTL Lv   3 Term      Vag-Spont      2 Term      Vag-Spont      1 Term      Vag-Spont          Gyn History:  Contraception: Jemima OCPs  H/o STIs: None  Pap Hx: Last pap smear 2024- NIL, OHR HPV positive  Hx of abnormal pap in  precancerous cells tx with cryo     Family History   Problem Relation Name Age of Onset    Breast cancer Maternal Grandmother      No Known Problems Father      Hernia Mother      Breast cancer Mother      Cervical cancer Mother      Breast cancer Maternal Great-Grandmother         Meds:   Current Outpatient Medications on File Prior to Visit   Medication Sig Dispense Refill    LORazepam (ATIVAN) 2 MG Tab Take 1 tablet (2 mg total) by mouth once. Take 1 hour prior to procedure. for 1 dose 1 tablet 0    oxybutynin (DITROPAN-XL) 5 MG TR24 Take 1 tablet (5 mg total) by mouth once daily. (Patient not taking: Reported on 2025) 30 tablet 6     No current  "facility-administered medications on file prior to visit.       Allergies: Review of patient's allergies indicates:  No Known Allergies    Social History     Tobacco Use    Smoking status: Every Day     Types: Vaping with nicotine     Passive exposure: Current    Smokeless tobacco: Never   Substance Use Topics    Alcohol use: Not Currently    Drug use: Never       Review of Systems  Negative unless in HPI    Objective:     /81   Temp 98.9 °F (37.2 °C) (Oral)   Resp 18   Ht 5' 4" (1.626 m)   Wt 84.8 kg (187 lb)   LMP 2024 (Exact Date)   SpO2 98%   BMI 32.10 kg/m²     Physical Exam:  Gen: Well-nourished, well-developed female appearing stated age. Alert, cooperative, in no acute distress.  Abdomen: Soft, non-tender, no masses.  External genitalia: Normal female genetalia without lesion, discharge or tenderness.  Speculum Exam: Vaginal vault without discharge, nonodorous, no lesions/masses seen.  Cervical os visualized as closed, no lesions/masses.      Note: RN chaperone present for entirety of exam.      Assessment/Plan:     40 y.o.  here for ParaGard insertion.    Problem List Items Addressed This Visit    None  Visit Diagnoses       Encounter for IUD insertion    -  Primary    Relevant Medications    copper 380 mm2 1 Intra Uterine Device IUD (Completed)    copper 380 mm2 1 Intra Uterine Device IUD    Other Relevant Orders    Insertion of IUD (Completed)          See same day procedure note.    Discussed patient and plan with Dr. Shabazz.      Yue Henriquez, MS3      France Fernandez MD  [facility] Resident, [facility]    "

## 2025-01-17 NOTE — PROCEDURES
Insertion of IUD    Date/Time: 1/17/2025 8:45 AM    Performed by: France Fernandez MD  Authorized by: France Shabazz MD    Consent:     Consent obtained:  Prior to procedure the appropriate consent was completed and verified    Consent given by:  Patient    Procedure risks and benefits discussed: yes      Patient questions answered: yes      Patient agrees, verbalizes understanding, and wants to proceed: yes     Device to be inserted was verified by patient: yes    Instructions and paperwork completed: yes    Insertion Procedure:   1 Intra Uterine Device copper 380 mm2       Pelvic exam performed: yes      Cervix cleaned and prepped: yes      Speculum placed in vagina: yes      Tenaculum applied to cervix: yes      Uterus sounded: yes      Uterus sound depth (cm):  9    IUD inserted with no complications: yes      IUD type:  ParaGard    Strings trimmed: yes    Post-procedure:     Patient tolerated procedure well: yes      Patient will follow up after next period: yes        After informed consents were signed, a time out was called. Pt placed in dorsal lithotomy position, sterile speculum inserted. Cervix cleansed with Betadine. Anterior lip of the cervix grasped with the single toothed tenaculum.  Uterus sounded to 9  cm.  IUD inserted and deployed without difficulty.  The IUD string was visible and trimmed and all instruments were removed from the vagina.  Tenaculum sites were hemostatic. Patient tolerated the procedure well. Minimal EBL noted.      IUD Information:   Paragard  Lot [id number]  EXP 04/2030    Last menstrual period - 12/23/2024      France Fernandez MD  [facility] Family Medicine Resident, [facility]

## 2025-01-24 ENCOUNTER — HOSPITAL ENCOUNTER (OUTPATIENT)
Dept: RADIOLOGY | Facility: HOSPITAL | Age: 40
Discharge: HOME | End: 2025-01-24
Attending: NURSE PRACTITIONER
Payer: MEDICAID

## 2025-01-24 DIAGNOSIS — N63.21 MASS OF UPPER OUTER QUADRANT OF LEFT BREAST: ICD-10-CM

## 2025-01-24 DIAGNOSIS — N63.21 MASS OF UPPER OUTER QUADRANT OF LEFT BREAST: Primary | ICD-10-CM

## 2025-01-24 PROCEDURE — 77065 DX MAMMO INCL CAD UNI: CPT | Mod: 26,LT,, | Performed by: STUDENT IN AN ORGANIZED HEALTH CARE EDUCATION/TRAINING PROGRAM

## 2025-01-24 PROCEDURE — 77061 BREAST TOMOSYNTHESIS UNI: CPT | Mod: 26,LT,, | Performed by: STUDENT IN AN ORGANIZED HEALTH CARE EDUCATION/TRAINING PROGRAM

## 2025-01-24 PROCEDURE — 77061 BREAST TOMOSYNTHESIS UNI: CPT | Mod: TC,LT

## 2025-01-24 PROCEDURE — 19083 BX BREAST 1ST LESION US IMAG: CPT | Mod: LT

## 2025-01-24 PROCEDURE — 19083 BX BREAST 1ST LESION US IMAG: CPT | Mod: LT,,, | Performed by: STUDENT IN AN ORGANIZED HEALTH CARE EDUCATION/TRAINING PROGRAM

## 2025-01-24 RX ORDER — LIDOCAINE HCL/EPINEPHRINE/PF 2%-1:200K
VIAL (ML) INJECTION
Status: DISPENSED
Start: 2025-01-24 | End: 2025-01-24

## 2025-01-24 RX ORDER — LIDOCAINE HYDROCHLORIDE 20 MG/ML
INJECTION, SOLUTION EPIDURAL; INFILTRATION; INTRACAUDAL; PERINEURAL
Status: DISPENSED
Start: 2025-01-24 | End: 2025-01-24

## 2025-01-27 LAB
ESTROGEN SERPL-MCNC: NORMAL PG/ML
INSULIN SERPL-ACNC: NORMAL U[IU]/ML
LAB AP CLINICAL INFORMATION: NORMAL
LAB AP GROSS DESCRIPTION: NORMAL
LAB AP REPORT FOOTNOTES: NORMAL

## 2025-01-28 NOTE — PROGRESS NOTES
By phone, I notified Karla of left breast pathology results (benign and concordant) and recommendation (Given the patient is 40 years old with limited time for the fibroadenoma to potentially increase in size before she enters menopause, recommend return to annual screening mammography with tomosynthesis, next exam due November, 2026.  No follow-up is necessary for the right breast asymmetries recalled from screening mammography that did not persist on additional diagnostic mammogram views) per Dr. Steven Montgomery.  Karla verbalized understanding of the results.

## 2025-02-03 DIAGNOSIS — R23.2 HOT FLASHES: Primary | ICD-10-CM

## 2025-02-03 DIAGNOSIS — Z00.00 WELL ADULT EXAM: ICD-10-CM

## 2025-02-03 DIAGNOSIS — Z13.6 SCREENING FOR HYPERTENSION: ICD-10-CM
